# Patient Record
Sex: FEMALE | Race: WHITE | NOT HISPANIC OR LATINO | URBAN - METROPOLITAN AREA
[De-identification: names, ages, dates, MRNs, and addresses within clinical notes are randomized per-mention and may not be internally consistent; named-entity substitution may affect disease eponyms.]

---

## 2022-09-14 ENCOUNTER — EMERGENCY (EMERGENCY)
Facility: HOSPITAL | Age: 27
LOS: 1 days | Discharge: HOME | End: 2022-09-14
Attending: EMERGENCY MEDICINE | Admitting: EMERGENCY MEDICINE
Payer: COMMERCIAL

## 2022-09-14 VITALS
TEMPERATURE: 98 F | OXYGEN SATURATION: 97 % | HEART RATE: 103 BPM | WEIGHT: 261.25 LBS | RESPIRATION RATE: 19 BRPM | DIASTOLIC BLOOD PRESSURE: 83 MMHG | SYSTOLIC BLOOD PRESSURE: 126 MMHG | HEIGHT: 65 IN

## 2022-09-14 VITALS
DIASTOLIC BLOOD PRESSURE: 79 MMHG | HEART RATE: 98 BPM | RESPIRATION RATE: 18 BRPM | OXYGEN SATURATION: 98 % | SYSTOLIC BLOOD PRESSURE: 129 MMHG | TEMPERATURE: 98 F

## 2022-09-14 LAB — HCG UR QL: NEGATIVE — SIGNIFICANT CHANGE UP

## 2022-09-14 PROCEDURE — 81025 URINE PREGNANCY TEST: CPT

## 2022-09-14 PROCEDURE — 70160 X-RAY EXAM OF NASAL BONES: CPT | Mod: 26

## 2022-09-14 PROCEDURE — 70160 X-RAY EXAM OF NASAL BONES: CPT

## 2022-09-14 PROCEDURE — 99284 EMERGENCY DEPT VISIT MOD MDM: CPT

## 2022-09-14 PROCEDURE — 99283 EMERGENCY DEPT VISIT LOW MDM: CPT

## 2022-09-14 NOTE — ED PROVIDER NOTE - CARE PROVIDER_API CALL
Chaka Melendrez (DO)  Surgery  [address], [address]  [place] [address]  Phone: [phone]  Fax: [phone]  Follow Up Time:

## 2022-09-14 NOTE — ED ADULT NURSE NOTE - OBJECTIVE STATEMENT
27yr old female walked into ED c/o nose injury; pt walking out of car, did not see dip on ground and hit nose n concrete; denies LOC; bleeding subsided; swelling noted to bridge of nose
no

## 2022-09-14 NOTE — ED ADULT TRIAGE NOTE - CHIEF COMPLAINT QUOTE
I was getting out the car and there was a dip in the street that I didn't see because it was dark and I fell and hit my nose on the concrete floor; denies LOC; bleeding subsided; swelling noted to bridge of nose

## 2022-09-14 NOTE — ED ADULT TRIAGE NOTE - TEMPERATURE IN CELSIUS (DEGREES C)
Instructions (Optional): Pending biopsy results, consider 5 FU treatment Detail Level: Zone Procedure To Be Performed At Next Visit: Treatment 36.9

## 2022-09-14 NOTE — ED PROVIDER NOTE - NSFOLLOWUPINSTRUCTIONS_ED_ALL_ED_FT
1) Follow-up with your Primary Medical Doctor and Dr. Melendrez. Call today / next business day for prompt follow-up.  2) Return to Emergency room for any worsening or persistent pain, weakness, fever, or any other concerning symptoms.  3) See attached instruction sheets for additional information, including information regarding signs and symptoms to look out for, reasons to seek immediate care and other important instructions.  4) Follow-up with any specialists as discussed / noted as well.      A nasal fracture is a break or crack in the bones of the nose or the tissue that helps to form the nose (cartilage). Minor breaks do not require treatment and usually heal on their own after about one month. Serious breaks may require treatment that could include surgery.      What are the causes?    A nasal fracture is usually caused by the strong force of a direct hit to the nose (blunt injury). This type of injury often occurs from:  •Playing a contact sport.      •Being involved in a car accident.      •Falling.      •Getting punched.        What are the signs or symptoms?    Symptoms of this condition include:  •Pain.      •Swelling of the nose.      •Bleeding from the nose.      •Bruising around the nose or bruising around the eyes (black eyes).      •Crooked appearance of the nose.        How is this diagnosed?    This condition may be diagnosed based on a physical exam. During the exam, the health care provider will:  •Gently feel the nose for signs of broken bones.      •Look inside the nostrils to check if there is a blood-filled swelling on the dividing wall between the nostrils (septal hematoma).      An X-ray of the nose may be taken. Sometimes, an X-ray may not show a nasal fracture even when one is present. In some cases, X-rays or a CT scan may be taken again 1–5 days later after the swelling has gone down.      How is this treated?    Treatment for this condition depends on the severity of the injury.  •Minor fractures that have not caused deformity often do not require treatment.    •For more serious fractures that have caused bones to move out of position, treatment may involve one of the following:  •Repositioning the bones without surgery. The health care provider may be able to do this in his or her office after you are given medicine to numb the nasal area (local anesthetic).      •Surgery. If surgery is needed, it will be done after the swelling is gone. Surgery will stabilize and align the fracture.          Follow these instructions at home:                  Activity     •Return to your normal activities as told by your health care provider. Ask your health care provider what activities are safe for you.      •Avoid contact sports for 3–4 weeks or as told by your health care provider.      General instructions   •If directed, put ice on the injured area:  •Put ice in a plastic bag.      •Place a towel between your skin and the bag.      •Leave the ice on for 20 minutes, 2–3 times a day.        •Take over-the-counter and prescription medicines only as told by your health care provider.      •If your nose starts to bleed, sit in an upright position while you squeeze the soft parts of your nose against the dividing wall between your nostrils (septum) for 10 minutes.      •Try to avoid blowing your nose.      •Keep all follow-up visits as told by your health care provider. This is important.        Contact a health care provider if:    •Your pain increases or becomes severe.      •You continue to have nosebleeds.      •The shape of your nose does not return to normal within 5 days.      •You have pus draining out of your nose.        Get help right away if:    •You have bleeding from your nose that does not stop after you pinch your nostrils closed for 20 minutes and keep ice on your nose.      •You have clear fluid draining out of your nose.      •You notice swelling near the septum inside the nose. This swelling is a septal hematoma that must be drained to help prevent infection.      •You have difficulty moving your eyes.      •You have repeated vomiting.        Summary    •A nasal fracture is a break or crack in the bones or cartilage of the nose.      •The fracture is usually caused by a blunt injury to the nose.      •Symptoms include pain, swelling, and facial bruising.      •Nasal fractures may heal on their own, or your health care provider may need to move the bones back into proper position. In some cases, surgery may be needed.      This information is not intended to replace advice given to you by your health care provider. Make sure you discuss any questions you have with your health care provider.

## 2022-09-14 NOTE — ED PROVIDER NOTE - NS ED ROS FT

## 2022-09-14 NOTE — ED ADULT NURSE NOTE - NSIMPLEMENTINTERV_GEN_ALL_ED
Implemented All Universal Safety Interventions:  [place] to call system. Call bell, personal items and telephone within reach. Instruct patient to call for assistance. Room bathroom lighting operational. Non-slip footwear when patient is off stretcher. Physically safe environment: no spills, clutter or unnecessary equipment. Stretcher in lowest position, wheels locked, appropriate side rails in place.

## 2022-09-14 NOTE — ED PROVIDER NOTE - OBJECTIVE STATEMENT
27-year-old female no past medical history status post mechanical fall tripped on street face planted complaining of nasal pain here for evaluation.  Initially had epistaxis that is now resolved.  No LOC no neck pain denies any other pain.  No nausea no vomiting.  No anticoagulant use.

## 2022-09-14 NOTE — ED PROVIDER NOTE - PATIENT PORTAL LINK FT
You can access the FollowMyHealth Patient Portal offered by [facility] by registering at the following website: [email or web address]. By joining [employer]’s FollowMyHealth portal, you will also be able to view your health information using other applications (apps) compatible with our system.

## 2022-09-14 NOTE — ED PROVIDER NOTE - PHYSICAL EXAMINATION
Gen: Alert, NAD  Head/eyes: NC/+mild swelling at nasal bridge, PERRL, EOMI, no entrapment  ENT: airway patent, b/l nostril clear, +dried blood, no septal hematoma  Neck: supple  Pulm/lung: Bilateral clear BS  CV/heart: RRR  GI/Abd: soft, NT/ND, +BS, no guarding/rebound tenderness  Musculoskeletal: no edema/erythema/cyanosis  Skin: no rash  Neuro: AAOx3, grossly intact
